# Patient Record
Sex: MALE | Race: OTHER | NOT HISPANIC OR LATINO
[De-identification: names, ages, dates, MRNs, and addresses within clinical notes are randomized per-mention and may not be internally consistent; named-entity substitution may affect disease eponyms.]

---

## 2018-06-06 PROBLEM — Z00.00 ENCOUNTER FOR PREVENTIVE HEALTH EXAMINATION: Status: ACTIVE | Noted: 2018-06-06

## 2018-06-27 ENCOUNTER — APPOINTMENT (OUTPATIENT)
Dept: PULMONOLOGY | Facility: CLINIC | Age: 73
End: 2018-06-27

## 2020-11-12 ENCOUNTER — APPOINTMENT (OUTPATIENT)
Dept: VASCULAR SURGERY | Facility: CLINIC | Age: 75
End: 2020-11-12
Payer: MEDICARE

## 2020-11-12 VITALS
HEIGHT: 65 IN | TEMPERATURE: 97.3 F | OXYGEN SATURATION: 98 % | DIASTOLIC BLOOD PRESSURE: 64 MMHG | BODY MASS INDEX: 24.66 KG/M2 | HEART RATE: 69 BPM | WEIGHT: 148 LBS | SYSTOLIC BLOOD PRESSURE: 155 MMHG

## 2020-11-12 PROCEDURE — 99072 ADDL SUPL MATRL&STAF TM PHE: CPT

## 2020-11-12 PROCEDURE — 99203 OFFICE O/P NEW LOW 30 MIN: CPT

## 2020-11-12 PROCEDURE — 93923 UPR/LXTR ART STDY 3+ LVLS: CPT

## 2020-11-12 NOTE — HISTORY OF PRESENT ILLNESS
[FreeTextEntry1] : 75 year old man referred for evaluation of PAD.\par He reports right calf cramping after walking for about 30 minutes, symptoms began about 6 months ago. he occasionally experiences cramps at rest but no pain in his foot at rest.\par No change in the appearance of his toes.\par Of note, patient has chronic back pain and experienced occasional shooting pain down his leg and paresthesia in his right leg.\par he has never smoked but is a long standing type 2 diabetic

## 2020-11-12 NOTE — PHYSICAL EXAM
[Normal Breath Sounds] : Normal breath sounds [Normal Rate and Rhythm] : normal rate and rhythm [1+] : right 1+ [2+] : left 2+ [Ankle Swelling (On Exam)] : present [Ankle Swelling Bilaterally] : bilaterally  [Ankle Swelling On The Right] : mild [No Rash or Lesion] : No rash or lesion [Alert] : alert [Oriented to Person] : oriented to person [Oriented to Place] : oriented to place [Oriented to Time] : oriented to time [Calm] : calm [JVD] : no jugular venous distention  [Abdomen Masses] : No abdominal masses [Abdomen Tenderness] : ~T ~M No abdominal tenderness [de-identified] : Well appearing [de-identified] : NCAT, EOMI [FreeTextEntry1] : RLE pulses weaker than left. Both feet warm and well perfused

## 2020-11-12 NOTE — ASSESSMENT
[FreeTextEntry1] : 75 year old man with right leg cramping, paresthesia and shooting pain.\par His right leg pulses are palpable but weaker than left side.\par OSCAR and PVR shows mild right leg PAD (OSCAR R-0.82, L-1.18)\par \par While he has mild PAD, his right leg symptoms are more likely due to lumbar radiculopathy.\par For his PAD, I recommend continued low dose ASA, statin therapy and optimal glycemic control.\par I explained to him that the risk for limb loss in the setting of mild PAD is < 1% per year. He therefore does not warrant further arterial work-up or intervention at this time.\par Recommend follow up in 1 year\par

## 2022-05-10 ENCOUNTER — APPOINTMENT (OUTPATIENT)
Dept: ORTHOPEDIC SURGERY | Facility: CLINIC | Age: 77
End: 2022-05-10
Payer: MEDICARE

## 2022-05-10 DIAGNOSIS — M79.604 PAIN IN RIGHT LEG: ICD-10-CM

## 2022-05-10 DIAGNOSIS — M17.11 UNILATERAL PRIMARY OSTEOARTHRITIS, RIGHT KNEE: ICD-10-CM

## 2022-05-10 DIAGNOSIS — M17.12 UNILATERAL PRIMARY OSTEOARTHRITIS, LEFT KNEE: ICD-10-CM

## 2022-05-10 DIAGNOSIS — M19.012 PRIMARY OSTEOARTHRITIS, LEFT SHOULDER: ICD-10-CM

## 2022-05-10 DIAGNOSIS — M19.011 PRIMARY OSTEOARTHRITIS, RIGHT SHOULDER: ICD-10-CM

## 2022-05-10 PROCEDURE — 99214 OFFICE O/P EST MOD 30 MIN: CPT | Mod: 25

## 2022-05-10 PROCEDURE — 20611 DRAIN/INJ JOINT/BURSA W/US: CPT | Mod: 50

## 2022-05-10 NOTE — DISCUSSION/SUMMARY
[de-identified] : BILATERAL KNEES EUFLEXXA  + BILATERAL SHOULDERS EUFLEXXA\par Patient Identification\par Name/: Verbal with patient and/or family\par Procedure Verification:\par Procedure confirmed with patient or family/designee\par Consent for procedure: Verbal Consent Given\par Relevant documentation completed, reviewed, and signed\par Clinical indications for procedure confirmed\par Time-out with all members of procedure team immediately prior to procedure:\par Correct patient identified. Agreement on procedure. Correct side and site.\par \par KNEE INTRAARTICULAR INJECTION - BILATERAL\par After verbal consent and identification of the correct patient and correct site, the BILATERAL knees  were prepped using alcohol swabs and betadine. This was allowed time to air dry. The pre-loaded Euflexxa was injected into the BILATERAL knees via the lateral knee portals with a 1 inch 22G needle. Ultrasound was used to ensure proper needle placement. The patient tolerated the procedure well. A sterile dressing was placed. After-care instructions were provided and included instructions to ice the area and to call if redness, pain, or fever develop. \par \par SHOULDER INTRAARTICULAR INJECTION - BILATERAL\par After verbal consent and identification of the correct patient and correct site, the BILATERAL shoulders were prepped using alcohol swabs and betadine. This was allowed time to air dry. The pre-loaded Euflexxa was injected into the BILATERAL shoulders via the glenohumeral portals with a 1 inch 22G needle. Ultrasound was used to ensure proper needle placement. The patient tolerated the procedure well. A sterile dressing was placed. After-care instructions were provided and included instructions to ice the area and to call if redness, pain, or fever develop. \par \par \par Follow up as needed. \par \par -----------------------------------------------\par Home Exercise\par The patient is instructed on a home exercise program.\par \par BABAR OLIVER Acting as a Scribe for Dr. Durán\par I, Babar Oliver, attest that this documentation has been prepared under the direction and in the presence of Provider Rolf Durán MD.\par \par Activity Modification\par The patient was advised to modify their activities.\par \par Dx / Natural History\par The patient was advised of the diagnosis.  The natural history of the pathology was explained in full to the patient in layman's terms.  Several different treatment options were discussed and explained in full to the patient including the risks and benefits of both surgical and non-surgical treatments.  All questions and concerns were answered.\par \par Pain Guide Activities\par The patient was advised to let pain guide the gradual advancement of activities.\par \par RICE\par I explained to the patient that rest, ice, compression, and elevation would benefit them.  They may return to activity after follow-up or when they no longer have any pain.

## 2022-05-10 NOTE — PHYSICAL EXAM
[de-identified] : Neurologic: normal coordination, normal DTR UE/LE , normal sensation, normal mood and affect, orientated and able to communicate. \par Skin: normal skin, no rash, no ulcers and no lesions. \par Lymphatic: no obvious lymphadenopathy in areas examined. \par Constitutional: well developed and well nourished. \par Cardiovascular: peripheral vascular exam is grossly normal. \par Pulmonary: no respiratory distress, lungs clear to auscultation bilaterally. \par Abdomen: normal bowel sounds, non-tender, no HSM and no mass. \par \par \par \par Right Shoulder: Range of motion of the shoulder is as follows: active forward flexion to: 150 degrees. active abduction to: 150 degrees. Ligament Stability and Special Tests of the shoulder is as follows: Impingement testing is positive. \par \par X-Ray Examination of the SHOULDER Minimum of 2 views: Bilateral shoulders Left shoulder shows grade 3 glenohumeral OA\par Right shoulder shows grade 4 glenohumeral OA \par \par Left Shoulder: Range of motion of the shoulder is as follows: active forward flexion to: 150 degrees. active abduction to: 150 degrees. \par \par Left Knee: Palpation of the knee is as follows: medial joint line tenderness. \par \par X-Ray Examination of the KNEE 4 or more views Left AP, lateral, skyline and AP both knees standing view Moderate medial OA

## 2022-06-30 ENCOUNTER — APPOINTMENT (OUTPATIENT)
Dept: VASCULAR SURGERY | Facility: CLINIC | Age: 77
End: 2022-06-30

## 2022-06-30 VITALS
HEIGHT: 65 IN | DIASTOLIC BLOOD PRESSURE: 72 MMHG | RESPIRATION RATE: 16 BRPM | BODY MASS INDEX: 25.16 KG/M2 | HEART RATE: 69 BPM | SYSTOLIC BLOOD PRESSURE: 125 MMHG | TEMPERATURE: 97.3 F | OXYGEN SATURATION: 98 % | WEIGHT: 151 LBS

## 2022-06-30 DIAGNOSIS — I73.9 PERIPHERAL VASCULAR DISEASE, UNSPECIFIED: ICD-10-CM

## 2022-06-30 PROCEDURE — 99213 OFFICE O/P EST LOW 20 MIN: CPT

## 2022-06-30 NOTE — ASSESSMENT
[FreeTextEntry1] : 76 year old man with right leg cramping, paresthesia and shooting pain.\par His right leg pulses are palpable but weaker than left side.\par I explained to him again that while he has mild PAD, his bilateral lower extremity symptoms are more likely due to lumbar radiculopathy.\par For his PAD, I recommend continued low dose ASA, statin therapy and optimal glycemic control.\par I explained to him that the risk for limb loss in the setting of mild PAD is < 1% per year. He therefore does not warrant further arterial work-up or intervention at this time.\par Return PRN

## 2022-06-30 NOTE — PHYSICAL EXAM
[JVD] : no jugular venous distention  [Normal Breath Sounds] : Normal breath sounds [Normal Rate and Rhythm] : normal rate and rhythm [1+] : right 1+ [2+] : left 2+ [Ankle Swelling (On Exam)] : present [Ankle Swelling Bilaterally] : bilaterally  [Ankle Swelling On The Right] : mild [Abdomen Masses] : No abdominal masses [Abdomen Tenderness] : ~T ~M No abdominal tenderness [No Rash or Lesion] : No rash or lesion [Alert] : alert [Oriented to Person] : oriented to person [Oriented to Place] : oriented to place [Oriented to Time] : oriented to time [Calm] : calm [de-identified] : Well appearing [de-identified] : NCAT, EOMI [FreeTextEntry1] : RLE pulses weaker than left. Both feet warm and well perfused

## 2022-06-30 NOTE — HISTORY OF PRESENT ILLNESS
[FreeTextEntry1] : 76 year old man referred for evaluation of PAD.\par He reports right calf cramping after walking for about 30 minutes, symptoms began about 6 months ago. he occasionally experiences cramps at rest but no pain in his foot at rest.\par No change in the appearance of his toes.\par Of note, patient has chronic back pain and experienced occasional shooting pain down his leg and paresthesia in his right leg.\par he has never smoked but is a long standing type 2 diabetic [de-identified] : Patient returns for follow up\par Continues to have bilateral lower extremity numbness and cramping at rest.\par

## 2022-07-11 ENCOUNTER — APPOINTMENT (OUTPATIENT)
Dept: CARDIOLOGY | Facility: CLINIC | Age: 77
End: 2022-07-11

## 2022-07-11 ENCOUNTER — NON-APPOINTMENT (OUTPATIENT)
Age: 77
End: 2022-07-11

## 2022-07-11 VITALS — DIASTOLIC BLOOD PRESSURE: 52 MMHG | SYSTOLIC BLOOD PRESSURE: 116 MMHG

## 2022-07-11 VITALS
DIASTOLIC BLOOD PRESSURE: 54 MMHG | HEIGHT: 65 IN | TEMPERATURE: 98.4 F | SYSTOLIC BLOOD PRESSURE: 118 MMHG | WEIGHT: 150 LBS | OXYGEN SATURATION: 98 % | HEART RATE: 68 BPM | BODY MASS INDEX: 24.99 KG/M2

## 2022-07-11 DIAGNOSIS — Z78.9 OTHER SPECIFIED HEALTH STATUS: ICD-10-CM

## 2022-07-11 DIAGNOSIS — Z82.49 FAMILY HISTORY OF ISCHEMIC HEART DISEASE AND OTHER DISEASES OF THE CIRCULATORY SYSTEM: ICD-10-CM

## 2022-07-11 DIAGNOSIS — E11.9 TYPE 2 DIABETES MELLITUS W/OUT COMPLICATIONS: ICD-10-CM

## 2022-07-11 DIAGNOSIS — Z82.3 FAMILY HISTORY OF STROKE: ICD-10-CM

## 2022-07-11 DIAGNOSIS — J30.2 OTHER SEASONAL ALLERGIC RHINITIS: ICD-10-CM

## 2022-07-11 DIAGNOSIS — N40.0 BENIGN PROSTATIC HYPERPLASIA WITHOUT LOWER URINARY TRACT SYMPMS: ICD-10-CM

## 2022-07-11 PROCEDURE — 93000 ELECTROCARDIOGRAM COMPLETE: CPT

## 2022-07-11 PROCEDURE — 99203 OFFICE O/P NEW LOW 30 MIN: CPT

## 2022-07-11 RX ORDER — HYDROCHLOROTHIAZIDE 25 MG/1
25 TABLET ORAL
Refills: 0 | Status: ACTIVE | COMMUNITY

## 2022-07-11 RX ORDER — ATORVASTATIN CALCIUM 20 MG/1
20 TABLET, FILM COATED ORAL
Qty: 90 | Refills: 0 | Status: ACTIVE | COMMUNITY
Start: 2022-07-11

## 2022-07-11 RX ORDER — AMLODIPINE BESYLATE 10 MG/1
10 TABLET ORAL DAILY
Qty: 90 | Refills: 1 | Status: ACTIVE | COMMUNITY
Start: 2022-07-11

## 2022-07-11 RX ORDER — METFORMIN HYDROCHLORIDE 1000 MG/1
1000 TABLET, COATED ORAL TWICE DAILY
Qty: 180 | Refills: 3 | Status: ACTIVE | COMMUNITY
Start: 2022-07-11

## 2022-07-11 RX ORDER — TAMSULOSIN HYDROCHLORIDE 0.4 MG/1
0.4 CAPSULE ORAL
Refills: 0 | Status: ACTIVE | COMMUNITY
Start: 2022-05-24

## 2022-07-11 RX ORDER — MONTELUKAST 10 MG/1
10 TABLET, FILM COATED ORAL DAILY
Refills: 0 | Status: ACTIVE | COMMUNITY
Start: 2022-07-11

## 2022-07-11 NOTE — HISTORY OF PRESENT ILLNESS
[FreeTextEntry1] : Patient is 77-year-old male with mild PAD, diabetes mellitus, hyperlipidemia presents for preventive cardiac examination.  Patient is physically active denies any exertional chest pain, dyspnea palpitations.  Patient is a non-smoker.  Patient reports having a normal cardiac work-up with Dr. Roge Hawk approximately 2 years ago\par \par Patient has no history of a prior CAD or CHF.  No history of for TIA or CVA.

## 2022-07-11 NOTE — ASSESSMENT
[FreeTextEntry1] : EKG 7/1/2022- Sinus  Rhythm \par -RSR(V1) -nondiagnostic. \par \par Assessment:\par 1.  Type 2 diabetes mellitus\par 2.  Hyperlipidemia\par 3.  Mild PAD\par \par Recommendations:\par 1.  Echocardiogram and regular stress test\par 2.  Follow-up after testing

## 2022-07-26 ENCOUNTER — APPOINTMENT (OUTPATIENT)
Dept: CARDIOLOGY | Facility: CLINIC | Age: 77
End: 2022-07-26

## 2022-07-26 PROCEDURE — 93306 TTE W/DOPPLER COMPLETE: CPT

## 2022-08-11 ENCOUNTER — APPOINTMENT (OUTPATIENT)
Dept: CARDIOLOGY | Facility: CLINIC | Age: 77
End: 2022-08-11

## 2022-08-11 PROCEDURE — 93015 CV STRESS TEST SUPVJ I&R: CPT

## 2022-08-17 ENCOUNTER — NON-APPOINTMENT (OUTPATIENT)
Age: 77
End: 2022-08-17

## 2022-08-17 DIAGNOSIS — R94.39 ABNORMAL RESULT OF OTHER CARDIOVASCULAR FUNCTION STUDY: ICD-10-CM

## 2022-09-08 ENCOUNTER — APPOINTMENT (OUTPATIENT)
Dept: CARDIOLOGY | Facility: CLINIC | Age: 77
End: 2022-09-08

## 2022-09-08 PROCEDURE — A9500: CPT

## 2022-09-08 PROCEDURE — 93015 CV STRESS TEST SUPVJ I&R: CPT

## 2022-09-08 PROCEDURE — 78452 HT MUSCLE IMAGE SPECT MULT: CPT

## 2022-09-30 ENCOUNTER — APPOINTMENT (OUTPATIENT)
Dept: CARDIOLOGY | Facility: CLINIC | Age: 77
End: 2022-09-30

## 2022-09-30 VITALS
SYSTOLIC BLOOD PRESSURE: 122 MMHG | OXYGEN SATURATION: 99 % | TEMPERATURE: 98.1 F | BODY MASS INDEX: 25.33 KG/M2 | HEIGHT: 65 IN | DIASTOLIC BLOOD PRESSURE: 61 MMHG | HEART RATE: 62 BPM | WEIGHT: 152 LBS

## 2022-09-30 DIAGNOSIS — E78.5 HYPERLIPIDEMIA, UNSPECIFIED: ICD-10-CM

## 2022-09-30 DIAGNOSIS — I10 ESSENTIAL (PRIMARY) HYPERTENSION: ICD-10-CM

## 2022-09-30 PROCEDURE — 99213 OFFICE O/P EST LOW 20 MIN: CPT

## 2022-09-30 NOTE — HISTORY OF PRESENT ILLNESS
[FreeTextEntry1] : Patient is 77-year-old male with mild PAD, diabetes mellitus, hyperlipidemia presents for preventive cardiac examination.  Patient is physically active denies any exertional chest pain, dyspnea palpitations.  Patient is a non-smoker.  Patient reports having a normal cardiac work-up with Dr. Roge Hawk approximately 2 years ago\par \par Since last visit, patient had an echocardiogram and a nuclear stress test, presents for a follow-up.  Patient has no complaints.\par Patient has no history of a prior CAD or CHF.  No history of for TIA or CVA.

## 2022-09-30 NOTE — ASSESSMENT
[FreeTextEntry1] : Echocardiogram July 26, 2022:\par Normal LV size and function, LVEF 65 to 70%.\par Normal RV size and function.\par Aortic root measuring 4 cm.  No pericardial effusion.  No significant valvular heart disease.\par \par Nuclear stress test: September 8, 2022: Patient exercised for 9 minutes, achieved 10 METS workload, positive EKG response noted however the nuclear scan is normal, positive EKG response probably represents a false positive.  Chest pain with exercise.\par \par EKG 7/1/2022- Sinus  Rhythm \par -RSR(V1) -nondiagnostic. \par \par Assessment:\par 1.  Type 2 diabetes mellitus\par 2.  Hyperlipidemia\par 3.  Mild PAD\par \par Recommendations:\par Test results were discussed with the patient.\par No further work-up is necessary or indicated\par Follow-up in 1 year

## 2023-09-05 ENCOUNTER — OFFICE (OUTPATIENT)
Dept: URBAN - METROPOLITAN AREA CLINIC 94 | Facility: CLINIC | Age: 78
Setting detail: OPHTHALMOLOGY
End: 2023-09-05
Payer: MEDICARE

## 2023-09-05 DIAGNOSIS — H43.813: ICD-10-CM

## 2023-09-05 DIAGNOSIS — H35.723: ICD-10-CM

## 2023-09-05 DIAGNOSIS — H35.033: ICD-10-CM

## 2023-09-05 DIAGNOSIS — H35.3131: ICD-10-CM

## 2023-09-05 DIAGNOSIS — E11.9: ICD-10-CM

## 2023-09-05 PROCEDURE — 92134 CPTRZ OPH DX IMG PST SGM RTA: CPT | Performed by: OPHTHALMOLOGY

## 2023-09-05 PROCEDURE — 92014 COMPRE OPH EXAM EST PT 1/>: CPT | Performed by: OPHTHALMOLOGY

## 2023-09-05 PROCEDURE — 92235 FLUORESCEIN ANGRPH MLTIFRAME: CPT | Performed by: OPHTHALMOLOGY

## 2023-09-05 ASSESSMENT — VISUAL ACUITY
OS_BCVA: 20/20
OD_BCVA: 20/20-1

## 2023-09-05 ASSESSMENT — REFRACTION_AUTOREFRACTION
OS_SPHERE: +1.50
OS_AXIS: 113
OD_AXIS: 078
OD_SPHERE: +0.25
OS_CYLINDER: -1.50
OD_CYLINDER: -0.50

## 2023-09-05 ASSESSMENT — KERATOMETRY
OS_K2POWER_DIOPTERS: 44.50
METHOD_AUTO_MANUAL: AUTO
OD_AXISANGLE_DEGREES: 170
OS_K1POWER_DIOPTERS: 43.75
OD_K1POWER_DIOPTERS: 44.00
OS_AXISANGLE_DEGREES: 022
OD_K2POWER_DIOPTERS: 44.50

## 2023-09-05 ASSESSMENT — AXIALLENGTH_DERIVED
OD_AL: 23.3196
OS_AL: 23.0814

## 2023-09-05 ASSESSMENT — SPHEQUIV_DERIVED
OD_SPHEQUIV: 0
OS_SPHEQUIV: 0.75

## 2023-09-05 ASSESSMENT — TONOMETRY
OS_IOP_MMHG: 14
OD_IOP_MMHG: 16

## 2023-09-05 ASSESSMENT — CONFRONTATIONAL VISUAL FIELD TEST (CVF)
OD_FINDINGS: FULL
OS_FINDINGS: FULL

## 2023-09-29 ENCOUNTER — APPOINTMENT (OUTPATIENT)
Dept: CARDIOLOGY | Facility: CLINIC | Age: 78
End: 2023-09-29

## 2024-11-06 ENCOUNTER — OFFICE (OUTPATIENT)
Dept: URBAN - METROPOLITAN AREA CLINIC 94 | Facility: CLINIC | Age: 79
Setting detail: OPHTHALMOLOGY
End: 2024-11-06
Payer: MEDICARE

## 2024-11-06 DIAGNOSIS — E11.9: ICD-10-CM

## 2024-11-06 DIAGNOSIS — H43.813: ICD-10-CM

## 2024-11-06 DIAGNOSIS — H35.3131: ICD-10-CM

## 2024-11-06 PROCEDURE — 92134 CPTRZ OPH DX IMG PST SGM RTA: CPT | Performed by: OPHTHALMOLOGY

## 2024-11-06 PROCEDURE — 92014 COMPRE OPH EXAM EST PT 1/>: CPT | Performed by: OPHTHALMOLOGY

## 2024-11-06 PROCEDURE — 92235 FLUORESCEIN ANGRPH MLTIFRAME: CPT | Performed by: OPHTHALMOLOGY

## 2024-11-06 ASSESSMENT — REFRACTION_AUTOREFRACTION
OS_CYLINDER: -1.50
OS_SPHERE: +1.50
OS_AXIS: 113
OD_AXIS: 078
OD_CYLINDER: -0.50
OD_SPHERE: +0.25

## 2024-11-06 ASSESSMENT — KERATOMETRY
METHOD_AUTO_MANUAL: AUTO
OD_K1POWER_DIOPTERS: 44.00
OD_K2POWER_DIOPTERS: 44.50
OD_AXISANGLE_DEGREES: 170
OS_K2POWER_DIOPTERS: 44.50
OS_K1POWER_DIOPTERS: 43.75
OS_AXISANGLE_DEGREES: 022

## 2024-11-06 ASSESSMENT — VISUAL ACUITY
OS_BCVA: 20/25-1
OD_BCVA: 20/25-1

## 2024-11-06 ASSESSMENT — TONOMETRY
OD_IOP_MMHG: 17
OS_IOP_MMHG: 14

## 2024-11-06 ASSESSMENT — CONFRONTATIONAL VISUAL FIELD TEST (CVF)
OD_FINDINGS: FULL
OS_FINDINGS: FULL

## 2025-05-07 ENCOUNTER — OFFICE (OUTPATIENT)
Dept: URBAN - METROPOLITAN AREA CLINIC 94 | Facility: CLINIC | Age: 80
Setting detail: OPHTHALMOLOGY
End: 2025-05-07
Payer: COMMERCIAL

## 2025-05-07 ENCOUNTER — RX ONLY (RX ONLY)
Age: 80
End: 2025-05-07

## 2025-05-07 DIAGNOSIS — H43.813: ICD-10-CM

## 2025-05-07 DIAGNOSIS — H35.3131: ICD-10-CM

## 2025-05-07 DIAGNOSIS — E11.9: ICD-10-CM

## 2025-05-07 PROCEDURE — 92235 FLUORESCEIN ANGRPH MLTIFRAME: CPT | Performed by: OPHTHALMOLOGY

## 2025-05-07 PROCEDURE — 92134 CPTRZ OPH DX IMG PST SGM RTA: CPT | Performed by: OPHTHALMOLOGY

## 2025-05-07 PROCEDURE — 92014 COMPRE OPH EXAM EST PT 1/>: CPT | Performed by: OPHTHALMOLOGY

## 2025-05-07 ASSESSMENT — REFRACTION_AUTOREFRACTION
OS_CYLINDER: -1.50
OS_AXIS: 113
OD_CYLINDER: -0.50
OS_SPHERE: +1.50
OD_AXIS: 078
OD_SPHERE: +0.25

## 2025-05-07 ASSESSMENT — CONFRONTATIONAL VISUAL FIELD TEST (CVF)
OS_FINDINGS: FULL
OD_FINDINGS: FULL

## 2025-05-07 ASSESSMENT — KERATOMETRY
METHOD_AUTO_MANUAL: AUTO
OS_AXISANGLE_DEGREES: 022
OS_K2POWER_DIOPTERS: 44.50
OD_K2POWER_DIOPTERS: 44.50
OD_K1POWER_DIOPTERS: 44.00
OD_AXISANGLE_DEGREES: 170
OS_K1POWER_DIOPTERS: 43.75

## 2025-05-07 ASSESSMENT — TONOMETRY: OD_IOP_MMHG: 15

## 2025-05-07 ASSESSMENT — VISUAL ACUITY
OD_BCVA: 20/20
OS_BCVA: 20/25-1

## 2025-05-29 ENCOUNTER — OFFICE (OUTPATIENT)
Dept: URBAN - METROPOLITAN AREA CLINIC 94 | Facility: CLINIC | Age: 80
Setting detail: OPHTHALMOLOGY
End: 2025-05-29
Payer: COMMERCIAL

## 2025-05-29 DIAGNOSIS — H40.013: ICD-10-CM

## 2025-05-29 DIAGNOSIS — Z96.1: ICD-10-CM

## 2025-05-29 DIAGNOSIS — E11.9: ICD-10-CM

## 2025-05-29 DIAGNOSIS — H43.813: ICD-10-CM

## 2025-05-29 DIAGNOSIS — H35.3131: ICD-10-CM

## 2025-05-29 PROCEDURE — 92133 CPTRZD OPH DX IMG PST SGM ON: CPT | Performed by: OPHTHALMOLOGY

## 2025-05-29 PROCEDURE — 99213 OFFICE O/P EST LOW 20 MIN: CPT | Performed by: OPHTHALMOLOGY

## 2025-05-29 PROCEDURE — 76514 ECHO EXAM OF EYE THICKNESS: CPT | Performed by: OPHTHALMOLOGY

## 2025-05-29 ASSESSMENT — REFRACTION_AUTOREFRACTION
OD_AXIS: 145
OS_AXIS: 135
OD_CYLINDER: -0.25
OD_SPHERE: +0.25
OS_SPHERE: +0.50
OS_CYLINDER: -0.75

## 2025-05-29 ASSESSMENT — PACHYMETRY
OD_CT_UM: 544
OS_CT_CORRECTION: 0
OD_CT_CORRECTION: 0
OS_CT_UM: 549

## 2025-05-29 ASSESSMENT — REFRACTION_CURRENTRX
OD_AXIS: 075
OS_AXIS: 105
OS_ADD: +2.50
OS_SPHERE: +1.00
OD_SPHERE: PLANO
OD_CYLINDER: -0.50
OS_OVR_VA: 20/
OS_VPRISM_DIRECTION: PROGS
OD_ADD: +2.50
OD_OVR_VA: 20/
OS_CYLINDER: -0.75
OD_VPRISM_DIRECTION: PROGS

## 2025-05-29 ASSESSMENT — TONOMETRY
OD_IOP_MMHG: 20
OS_IOP_MMHG: 21
OS_IOP_MMHG: 20

## 2025-05-29 ASSESSMENT — KERATOMETRY
METHOD_AUTO_MANUAL: AUTO
OD_AXISANGLE_DEGREES: 090
OS_AXISANGLE_DEGREES: 030
OD_K2POWER_DIOPTERS: 44.75
OD_K1POWER_DIOPTERS: 44.75
OS_K2POWER_DIOPTERS: 44.75
OS_K1POWER_DIOPTERS: 44.25

## 2025-05-29 ASSESSMENT — CONFRONTATIONAL VISUAL FIELD TEST (CVF)
OS_FINDINGS: FULL
OD_FINDINGS: FULL

## 2025-05-29 ASSESSMENT — VISUAL ACUITY
OD_BCVA: 20/25
OS_BCVA: 20/25